# Patient Record
Sex: FEMALE | URBAN - METROPOLITAN AREA
[De-identification: names, ages, dates, MRNs, and addresses within clinical notes are randomized per-mention and may not be internally consistent; named-entity substitution may affect disease eponyms.]

---

## 2021-04-08 DIAGNOSIS — Z23 ENCOUNTER FOR IMMUNIZATION: ICD-10-CM

## 2021-06-01 ENCOUNTER — TELEPHONE (OUTPATIENT)
Dept: DERMATOLOGY | Facility: CLINIC | Age: 46
End: 2021-06-01

## 2021-06-01 NOTE — TELEPHONE ENCOUNTER
Spoke with patient advised we do not accept her insurance but can fill out a out of network form to allow patient to come here  Patient was also advised our surgeon does not recommend a an excision and can schedule a consultation to discuss  Patient would like to wait until her insurance changes to come here for further treatment

## 2021-06-01 NOTE — TELEPHONE ENCOUNTER
[facility] called wanting to know if we would perform an excision on  Patient  Advised to have patient biopsy results faxed to our office to have our surgeon review  Records reviewed by Solis King and her recommendation is to not excise; due to its severity  Patient is welcomed to have consultation with our office  Spoke with Tatum from [facility] advised our recomendation and can our office with patient decision

## 2021-06-01 NOTE — TELEPHONE ENCOUNTER
Called patient left message on voicemail to return my call     Patient insurance is [facility] we do not accept insurance, patient can have a out of network form filed out before coming to our office  Patient does have the option of coming here by signing the financial waiver